# Patient Record
Sex: FEMALE | Race: WHITE | Employment: OTHER | URBAN - METROPOLITAN AREA
[De-identification: names, ages, dates, MRNs, and addresses within clinical notes are randomized per-mention and may not be internally consistent; named-entity substitution may affect disease eponyms.]

---

## 2024-04-03 ENCOUNTER — HOSPITAL ENCOUNTER (EMERGENCY)
Facility: HOSPITAL | Age: 84
Discharge: HOME | End: 2024-04-03
Payer: MEDICARE

## 2024-04-03 ENCOUNTER — APPOINTMENT (OUTPATIENT)
Facility: HOSPITAL | Age: 84
End: 2024-04-03
Payer: MEDICARE

## 2024-04-03 VITALS
HEIGHT: 70 IN | HEART RATE: 68 BPM | OXYGEN SATURATION: 99 % | DIASTOLIC BLOOD PRESSURE: 76 MMHG | BODY MASS INDEX: 16.75 KG/M2 | SYSTOLIC BLOOD PRESSURE: 159 MMHG | WEIGHT: 117 LBS | TEMPERATURE: 97.9 F | RESPIRATION RATE: 16 BRPM

## 2024-04-03 DIAGNOSIS — R07.9 CHEST PAIN, UNSPECIFIED TYPE: Primary | ICD-10-CM

## 2024-04-03 DIAGNOSIS — R03.0 ELEVATED BLOOD PRESSURE READING: ICD-10-CM

## 2024-04-03 DIAGNOSIS — R60.0 PERIPHERAL EDEMA: ICD-10-CM

## 2024-04-03 LAB
ALBUMIN SERPL-MCNC: 3.7 G/DL (ref 3.5–5)
ALBUMIN/GLOB SERPL: 1.3 (ref 1.1–2.2)
ALP SERPL-CCNC: 98 U/L (ref 45–117)
ALT SERPL-CCNC: 21 U/L (ref 12–78)
ANION GAP SERPL CALC-SCNC: 5 MMOL/L (ref 5–15)
AST SERPL-CCNC: 25 U/L (ref 15–37)
BASOPHILS # BLD: 0.1 K/UL (ref 0–0.1)
BASOPHILS NFR BLD: 1 % (ref 0–1)
BILIRUB SERPL-MCNC: 1.6 MG/DL (ref 0.2–1)
BUN SERPL-MCNC: 17 MG/DL (ref 6–20)
BUN/CREAT SERPL: 23 (ref 12–20)
CALCIUM SERPL-MCNC: 9.1 MG/DL (ref 8.5–10.1)
CHLORIDE SERPL-SCNC: 103 MMOL/L (ref 97–108)
CO2 SERPL-SCNC: 33 MMOL/L (ref 21–32)
CREAT SERPL-MCNC: 0.74 MG/DL (ref 0.55–1.02)
D DIMER PPP FEU-MCNC: 0.64 MG/L FEU (ref 0–0.65)
DIFFERENTIAL METHOD BLD: ABNORMAL
ECHO BSA: 1.62 M2
EKG ATRIAL RATE: 68 BPM
EKG DIAGNOSIS: NORMAL
EKG P AXIS: 73 DEGREES
EKG P-R INTERVAL: 210 MS
EKG Q-T INTERVAL: 412 MS
EKG QRS DURATION: 96 MS
EKG QTC CALCULATION (BAZETT): 438 MS
EKG R AXIS: -52 DEGREES
EKG T AXIS: 68 DEGREES
EKG VENTRICULAR RATE: 68 BPM
EOSINOPHIL # BLD: 0.1 K/UL (ref 0–0.4)
EOSINOPHIL NFR BLD: 2 % (ref 0–7)
ERYTHROCYTE [DISTWIDTH] IN BLOOD BY AUTOMATED COUNT: 13.4 % (ref 11.5–14.5)
GLOBULIN SER CALC-MCNC: 2.9 G/DL (ref 2–4)
GLUCOSE SERPL-MCNC: 92 MG/DL (ref 65–100)
HCT VFR BLD AUTO: 33.7 % (ref 35–47)
HGB BLD-MCNC: 11.6 G/DL (ref 11.5–16)
IMM GRANULOCYTES # BLD AUTO: 0 K/UL (ref 0–0.04)
IMM GRANULOCYTES NFR BLD AUTO: 0 % (ref 0–0.5)
LIPASE SERPL-CCNC: 47 U/L (ref 13–75)
LYMPHOCYTES # BLD: 1.1 K/UL (ref 0.8–3.5)
LYMPHOCYTES NFR BLD: 17 % (ref 12–49)
MCH RBC QN AUTO: 34.3 PG (ref 26–34)
MCHC RBC AUTO-ENTMCNC: 34.4 G/DL (ref 30–36.5)
MCV RBC AUTO: 99.7 FL (ref 80–99)
MONOCYTES # BLD: 0.6 K/UL (ref 0–1)
MONOCYTES NFR BLD: 9 % (ref 5–13)
NEUTS SEG # BLD: 4.5 K/UL (ref 1.8–8)
NEUTS SEG NFR BLD: 71 % (ref 32–75)
NRBC # BLD: 0 K/UL (ref 0–0.01)
NRBC BLD-RTO: 0 PER 100 WBC
NT PRO BNP: 354 PG/ML (ref 0–450)
PLATELET # BLD AUTO: 231 K/UL (ref 150–400)
PMV BLD AUTO: 10.6 FL (ref 8.9–12.9)
POTASSIUM SERPL-SCNC: 3.7 MMOL/L (ref 3.5–5.1)
PROT SERPL-MCNC: 6.6 G/DL (ref 6.4–8.2)
RBC # BLD AUTO: 3.38 M/UL (ref 3.8–5.2)
RBC MORPH BLD: ABNORMAL
SODIUM SERPL-SCNC: 141 MMOL/L (ref 136–145)
TROPONIN I SERPL HS-MCNC: 24 NG/L (ref 0–51)
TROPONIN I SERPL HS-MCNC: 27 NG/L (ref 0–51)
WBC # BLD AUTO: 6.4 K/UL (ref 3.6–11)

## 2024-04-03 PROCEDURE — 71045 X-RAY EXAM CHEST 1 VIEW: CPT

## 2024-04-03 PROCEDURE — 36415 COLL VENOUS BLD VENIPUNCTURE: CPT

## 2024-04-03 PROCEDURE — 80053 COMPREHEN METABOLIC PANEL: CPT

## 2024-04-03 PROCEDURE — 85379 FIBRIN DEGRADATION QUANT: CPT

## 2024-04-03 PROCEDURE — 83690 ASSAY OF LIPASE: CPT

## 2024-04-03 PROCEDURE — 93971 EXTREMITY STUDY: CPT

## 2024-04-03 PROCEDURE — 85025 COMPLETE CBC W/AUTO DIFF WBC: CPT

## 2024-04-03 PROCEDURE — 83880 ASSAY OF NATRIURETIC PEPTIDE: CPT

## 2024-04-03 PROCEDURE — 99285 EMERGENCY DEPT VISIT HI MDM: CPT

## 2024-04-03 PROCEDURE — 84484 ASSAY OF TROPONIN QUANT: CPT

## 2024-04-03 ASSESSMENT — HEART SCORE: ECG: NORMAL

## 2024-04-03 ASSESSMENT — PAIN - FUNCTIONAL ASSESSMENT: PAIN_FUNCTIONAL_ASSESSMENT: 0-10

## 2024-04-03 ASSESSMENT — PAIN DESCRIPTION - ORIENTATION: ORIENTATION: LEFT

## 2024-04-03 ASSESSMENT — PAIN SCALES - GENERAL: PAINLEVEL_OUTOF10: 6

## 2024-04-03 ASSESSMENT — PAIN DESCRIPTION - LOCATION: LOCATION: LEG

## 2024-04-03 ASSESSMENT — PAIN DESCRIPTION - DESCRIPTORS: DESCRIPTORS: ACHING

## 2024-04-03 NOTE — ED PROVIDER NOTES
[facility] EMERGENCY DEPT  EMERGENCY DEPARTMENT ENCOUNTER       Pt Name: Mirtha Espinal  MRN: [id number]  Birthdate 1940  Date of evaluation: 4/3/2024  Provider: JAMES Corbin   PCP: No primary care provider on file.  Note Started: 4:26 PM EDT 4/3/24     CHIEF COMPLAINT       Chief Complaint   Patient presents with    Chest Pain    Leg Swelling        HISTORY OF PRESENT ILLNESS: 1 or more elements      History From: Patient and Patient's Daughter    Mirtha Espinal is a 83 y.o. female with past medical history of Alzheimer's, who presents to the ED for evaluation of an episode of chest pain on Sunday, 3/31.  Patient accompanied by daughter who also contributes to history.  States her mother had an episode of chest pain on Sunday, states it was localized, gradually resolved and has not had any chest pain since, but the daughter would like her to be evaluated.  States they did just moved to the area several months ago and had not yet established primary care until their appointment in July.  Daughter states that she was on medications for hypertension and depression, but she took her off of these back in November.  States she has had some intermittent lower extremity swelling, seems after she has been up on her feet all day, and the swelling is gotten better today.  States she noticed it to her left leg more than her right.  States that yesterday she said her leg was itchy.  No swelling today.  Denies trauma or injuries.  Patient denies any chest pain at this time.  Denies prior cardiac history. Denies any shortness of breath, nausea, vomiting, diarrhea.  Denies recent illnesses.  States she is otherwise in her usual state of health.     Nursing Notes were all reviewed and agreed with or any disagreements were addressed in the HPI.     REVIEW OF SYSTEMS      Review of Systems   All other systems reviewed and are negative.       Positives and Pertinent negatives as per HPI.    PAST HISTORY     Past Medical

## 2024-04-03 NOTE — ED TRIAGE NOTES
Per daughter pt is very hard of hearing. Pt had chest pain on Sunday and then it resolved. Pt denies cp at this present time but reports left leg swelling and leg pain.

## 2024-04-03 NOTE — ED NOTES
Patient (s) daughter given copy of dc instructions and 0 script(s). Patient (s) daughter verbalized understanding of instructions and script (s). Patient given a current medication reconciliation form and verbalized understanding of their medications. Patient (s)daughter verbalized understanding of the importance of discussing medications with his or her physician or clinic they will be following up with. Patient alert and oriented and in no acute distress. Patient discharged home ambulatory with daughter.

## 2024-04-03 NOTE — ED NOTES
Pt presents to ED ambulatory complaining of having chest pain x 3 days ago and swelling on the left lower leg, but denies any chest pain currently. Pt is alert and oriented x 4, RR even and unlabored, skin is warm and dry. Assessment completed and pt updated on plan of care.  Call bell in reach.        Emergency Department Nursing Plan of Care       The Nursing Plan of Care is developed from the Nursing assessment and Emergency Department Attending provider initial evaluation.  The plan of care may be reviewed in the “ED Provider note”.    The Plan of Care was developed with the following considerations:   Patient / Family readiness to learn indicated by:verbalized understanding  Persons(s) to be included in education: patient and family  Barriers to Learning/Limitations:None    Signed

## 2024-04-03 NOTE — CARE COORDINATION
CM received a consult from ED to assist w/ PCP follow up, referrals for other support services. CM will meet w/ pt soon.     CM met w/ pt and her daughter (Evita Ordaz) at bedside. Pt gave verbal permission for CM to speak w/ daughter on her behalf. Pt has Alzheimer's and in need of PCP; currently has an appt for July. Moved here from [place] to be closer to daughter; needs to be connected w/ social support, possible personal care or home health.     Pt's daughter gave verbal permission for CM to make a referral for [facility], to connect pt w/ Care Transitions program. CM also offered to attempt finding an earlier PCP appt. Pt's daughter asked CM asked attending provider about Neuro follow up and local Alzheimer's resources.    CM scheduled pt for new pt appt w/ Dr. Carrillo at [facility] for 4/8/24 at 1:30pm. CM will send [facility] referral tomorrow morning. Also adding [employer] info,  Neuropsych provider on AVS for pt's family to reach out to for additional support.     Sendy Castellanos, SEAN, CM

## 2024-04-03 NOTE — DISCHARGE INSTRUCTIONS
Thank You!    It was a pleasure taking care of you in our Emergency Department today. We know that when you come to [facility], you are entrusting us with your health, comfort, and safety. Our clinicians honor that trust, and truly appreciate the opportunity to care for you and your loved ones.    If you receive a survey about your Emergency Department experience today, please fill it out.  We value your feedback. Thank you.      Zakiya Toscano PA-C    ___________________________________  I have included a copy of your lab results and/or radiologic studies from today's visit so you can have them easily available at your follow-up visit.   Recent Results (from the past 12 hour(s))   EKG 12 Lead    Collection Time: 04/03/24  2:06 PM   Result Value Ref Range    Ventricular Rate 68 BPM    Atrial Rate 68 BPM    P-R Interval 210 ms    QRS Duration 96 ms    Q-T Interval 412 ms    QTc Calculation (Bazett) 438 ms    P Axis 73 degrees    R Axis -52 degrees    T Axis 68 degrees    Diagnosis       Sinus rhythm with 1st degree AV block with frequent premature ventricular   complexes  Left anterior fascicular block  Moderate voltage criteria for LVH, may be normal variant ( R in aVL , [employer]   product )  Abnormal ECG  No previous ECGs available     CBC with Auto Differential    Collection Time: 04/03/24  2:19 PM   Result Value Ref Range    WBC 6.4 3.6 - 11.0 K/uL    RBC 3.38 (L) 3.80 - 5.20 M/uL    Hemoglobin 11.6 11.5 - 16.0 g/dL    Hematocrit 33.7 (L) 35.0 - 47.0 %    MCV 99.7 (H) 80.0 - 99.0 FL    MCH 34.3 (H) 26.0 - 34.0 PG    MCHC 34.4 30.0 - 36.5 g/dL    RDW 13.4 11.5 - 14.5 %    Platelets 231 150 - 400 K/uL    MPV 10.6 8.9 - 12.9 FL    Nucleated RBCs 0.0 0  WBC    nRBC 0.00 0.00 - 0.01 K/uL    Neutrophils % 71 32 - 75 %    Lymphocytes % 17 12 - 49 %    Monocytes % 9 5 - 13 %    Eosinophils % 2 0 - 7 %    Basophils % 1 0 - 1 %    Immature Granulocytes 0 0.0 - 0.5 %    Neutrophils

## 2024-04-09 ENCOUNTER — OFFICE VISIT (OUTPATIENT)
Age: 84
End: 2024-04-09
Payer: MEDICARE

## 2024-04-09 VITALS
OXYGEN SATURATION: 98 % | BODY MASS INDEX: 17.49 KG/M2 | HEART RATE: 63 BPM | HEIGHT: 70 IN | SYSTOLIC BLOOD PRESSURE: 130 MMHG | WEIGHT: 122.2 LBS | DIASTOLIC BLOOD PRESSURE: 64 MMHG

## 2024-04-09 DIAGNOSIS — I49.3 FREQUENT PVCS: ICD-10-CM

## 2024-04-09 DIAGNOSIS — R07.9 CHEST PAIN, UNSPECIFIED TYPE: Primary | ICD-10-CM

## 2024-04-09 DIAGNOSIS — I10 PRIMARY HYPERTENSION: ICD-10-CM

## 2024-04-09 PROCEDURE — 1123F ACP DISCUSS/DSCN MKR DOCD: CPT | Performed by: NURSE PRACTITIONER

## 2024-04-09 PROCEDURE — 99205 OFFICE O/P NEW HI 60 MIN: CPT | Performed by: NURSE PRACTITIONER

## 2024-04-09 PROCEDURE — 3075F SYST BP GE 130 - 139MM HG: CPT | Performed by: NURSE PRACTITIONER

## 2024-04-09 PROCEDURE — 3078F DIAST BP <80 MM HG: CPT | Performed by: NURSE PRACTITIONER

## 2024-04-09 RX ORDER — ASPIRIN 81 MG/1
81 TABLET, CHEWABLE ORAL DAILY
COMMUNITY

## 2024-04-09 RX ORDER — MONTELUKAST SODIUM 10 MG/1
TABLET ORAL
COMMUNITY
Start: 2024-04-08

## 2024-04-09 RX ORDER — ALENDRONATE SODIUM 70 MG/1
70 TABLET ORAL
COMMUNITY
Start: 2024-04-03

## 2024-04-09 RX ORDER — ACETAMINOPHEN 160 MG
2000 TABLET,DISINTEGRATING ORAL DAILY
COMMUNITY
End: 2024-04-09

## 2024-04-09 RX ORDER — FLUTICASONE PROPIONATE 50 MCG
SPRAY, SUSPENSION (ML) NASAL
COMMUNITY
Start: 2024-04-08 | End: 2024-04-09

## 2024-04-09 RX ORDER — ALBUTEROL SULFATE 90 UG/1
AEROSOL, METERED RESPIRATORY (INHALATION)
COMMUNITY
Start: 2024-04-08 | End: 2024-04-09 | Stop reason: ALTCHOICE

## 2024-04-09 RX ORDER — DIPHENHYDRAMINE HCL 25 MG
25 CAPSULE ORAL EVERY 6 HOURS PRN
COMMUNITY

## 2024-04-09 RX ORDER — FLUTICASONE FUROATE AND VILANTEROL TRIFENATATE 100; 25 UG/1; UG/1
POWDER RESPIRATORY (INHALATION)
COMMUNITY
Start: 2024-04-08 | End: 2024-04-09 | Stop reason: ALTCHOICE

## 2024-04-09 ASSESSMENT — ENCOUNTER SYMPTOMS
VOMITING: 0
ABDOMINAL DISTENTION: 0
NAUSEA: 1
ABDOMINAL PAIN: 0

## 2024-04-09 ASSESSMENT — PATIENT HEALTH QUESTIONNAIRE - PHQ9
1. LITTLE INTEREST OR PLEASURE IN DOING THINGS: NOT AT ALL
SUM OF ALL RESPONSES TO PHQ QUESTIONS 1-9: 0
SUM OF ALL RESPONSES TO PHQ QUESTIONS 1-9: 0
SUM OF ALL RESPONSES TO PHQ9 QUESTIONS 1 & 2: 0
2. FEELING DOWN, DEPRESSED OR HOPELESS: NOT AT ALL
SUM OF ALL RESPONSES TO PHQ QUESTIONS 1-9: 0
SUM OF ALL RESPONSES TO PHQ QUESTIONS 1-9: 0

## 2024-04-09 ASSESSMENT — VISUAL ACUITY: OU: 1

## 2024-04-09 NOTE — PROGRESS NOTES
Subjective:   Mirtha Espinal is a 83 y.o.  female with a past medical history including mild Alzheimer's dementia, asthma, and osteoporosis who presents today for post ED visit follow-up.    Patient recently developed a new sensation of chest pain and was subsequently evaluated the ED and had a normal cardiopulmonary workup.    Patient tells me that she was in her house doing chores when she started develop a sensation of \"pressure and heaviness\" in her chest.  She denies any pain, but felt quite uncomfortable.  She also felt a little bit dyspneic.  She started to feel quite anxious and texted her daughter for help but her daughter, who lives next-door, did not see the text.  She ended up walking to her daughter's house where she apparently had an elevated blood pressure and looked a little bit pale.  Her symptoms all resolved within about 15 minutes.  She also reports feeling a little bit of belching and dyspepsia at the time of her chest discomfort.  She was evaluated the ED that day, but 2 days later.    She recently moved to this area from [place].  She has a good relationship with her daughter (Evita) who is here today.    She did not take her prescribed bisphosphonates on the day of the symptoms and it does not sound like she was in a supine position or recently had any food.    She has a longstanding history of well-controlled asthma and does not believe the symptoms are consistent with her prior asthma attacks.    She denies any recurrent chest pain, palpitations, lightheadedness/dizziness, easy fatigability, dyspnea with activities.  She does experience intermittent chronic lower extremity swelling that improves with elevation.  Denies any cardiac history or any prior cardiac testing.      Primary Care Physician: No primary care provider on file.    Tobacco use: None  Alcohol use: None  Illicit drug use: None  Occupation: Retired  Pertinent Family history: No pertinent cardiac history  Physical

## 2024-04-09 NOTE — PATIENT INSTRUCTIONS
Definitely start taking 81 mg of aspirin daily until we can get the stress test to understand a little bit more about the heart.    We will also do a 5-day heart monitor since I heard quite a bit of skipped beats.  I will provide some printed information about the skipped beats, called PVCs.

## 2024-04-18 ENCOUNTER — HOSPITAL ENCOUNTER (OUTPATIENT)
Facility: HOSPITAL | Age: 84
Discharge: HOME | End: 2024-04-20
Payer: MEDICARE

## 2024-04-18 ENCOUNTER — HOSPITAL ENCOUNTER (OUTPATIENT)
Facility: HOSPITAL | Age: 84
End: 2024-04-18
Payer: MEDICARE

## 2024-04-18 VITALS
HEIGHT: 70 IN | RESPIRATION RATE: 18 BRPM | BODY MASS INDEX: 17.47 KG/M2 | SYSTOLIC BLOOD PRESSURE: 140 MMHG | WEIGHT: 122 LBS | DIASTOLIC BLOOD PRESSURE: 80 MMHG | HEART RATE: 76 BPM

## 2024-04-18 DIAGNOSIS — R07.9 CHEST PAIN, UNSPECIFIED TYPE: ICD-10-CM

## 2024-04-18 DIAGNOSIS — I49.3 FREQUENT PVCS: ICD-10-CM

## 2024-04-18 DIAGNOSIS — I10 PRIMARY HYPERTENSION: ICD-10-CM

## 2024-04-18 LAB
ECHO BSA: 1.65 M2
ECHO BSA: 1.65 M2
EKG DIAGNOSIS: NORMAL
STRESS BASELINE DIAS BP: 82 MMHG
STRESS BASELINE HR: 71 BPM
STRESS BASELINE SYS BP: 140 MMHG
STRESS ESTIMATED WORKLOAD: 1 METS
STRESS EXERCISE DUR MIN: 3 MIN
STRESS EXERCISE DUR SEC: 45 SEC
STRESS O2 SAT REST: 99 %
STRESS PEAK DIAS BP: 73 MMHG
STRESS PEAK SYS BP: 159 MMHG
STRESS PERCENT HR ACHIEVED: 70 %
STRESS POST PEAK HR: 97 BPM
STRESS RATE PRESSURE PRODUCT: NORMAL BPM*MMHG
STRESS TARGET HR: 138 BPM

## 2024-04-18 PROCEDURE — 3430000000 HC RX DIAGNOSTIC RADIOPHARMACEUTICAL: Performed by: NURSE PRACTITIONER

## 2024-04-18 PROCEDURE — A9500 TC99M SESTAMIBI: HCPCS | Performed by: NURSE PRACTITIONER

## 2024-04-18 PROCEDURE — 93017 CV STRESS TEST TRACING ONLY: CPT

## 2024-04-18 PROCEDURE — 6360000002 HC RX W HCPCS: Performed by: NURSE PRACTITIONER

## 2024-04-18 PROCEDURE — 93242 EXT ECG>48HR<7D RECORDING: CPT

## 2024-04-18 PROCEDURE — 93018 CV STRESS TEST I&R ONLY: CPT | Performed by: SPECIALIST

## 2024-04-18 PROCEDURE — 93016 CV STRESS TEST SUPVJ ONLY: CPT | Performed by: SPECIALIST

## 2024-04-18 RX ORDER — TETRAKIS(2-METHOXYISOBUTYLISOCYANIDE)COPPER(I) TETRAFLUOROBORATE 1 MG/ML
30 INJECTION, POWDER, LYOPHILIZED, FOR SOLUTION INTRAVENOUS ONCE
Status: COMPLETED | OUTPATIENT
Start: 2024-04-18 | End: 2024-04-18

## 2024-04-18 RX ORDER — TETRAKIS(2-METHOXYISOBUTYLISOCYANIDE)COPPER(I) TETRAFLUOROBORATE 1 MG/ML
10 INJECTION, POWDER, LYOPHILIZED, FOR SOLUTION INTRAVENOUS ONCE
Status: COMPLETED | OUTPATIENT
Start: 2024-04-18 | End: 2024-04-18

## 2024-04-18 RX ORDER — REGADENOSON 0.08 MG/ML
0.4 INJECTION, SOLUTION INTRAVENOUS
Status: COMPLETED | OUTPATIENT
Start: 2024-04-18 | End: 2024-04-18

## 2024-04-18 RX ADMIN — TECHNETIUM TC-99M SESTAMIBI 30 MILLICURIE: 1 INJECTION INTRAVENOUS at 10:30

## 2024-04-18 RX ADMIN — REGADENOSON 0.4 MG: 0.08 INJECTION, SOLUTION INTRAVENOUS at 10:32

## 2024-04-18 RX ADMIN — TECHNETIUM TC-99M SESTAMIBI 10 MILLICURIE: 1 INJECTION INTRAVENOUS at 08:15

## 2024-04-19 NOTE — PROGRESS NOTES
Discussed normal stress test with patient's daughter.  They were advised that they can stop taking aspirin at this time.  Will plan on follow-up next month to discuss results of event monitor and treatment of frequent PVCs.

## 2024-05-03 LAB — ECHO BSA: 1.65 M2

## 2024-05-06 LAB — ECHO BSA: 1.65 M2

## 2024-05-07 ENCOUNTER — OFFICE VISIT (OUTPATIENT)
Age: 84
End: 2024-05-07

## 2024-05-07 VITALS
SYSTOLIC BLOOD PRESSURE: 112 MMHG | BODY MASS INDEX: 17.47 KG/M2 | DIASTOLIC BLOOD PRESSURE: 68 MMHG | HEART RATE: 52 BPM | OXYGEN SATURATION: 99 % | HEIGHT: 70 IN | WEIGHT: 122 LBS

## 2024-05-07 DIAGNOSIS — R07.9 CHEST PAIN, UNSPECIFIED TYPE: Primary | ICD-10-CM

## 2024-05-07 DIAGNOSIS — I47.19 PAT (PAROXYSMAL ATRIAL TACHYCARDIA) (HCC): ICD-10-CM

## 2024-05-07 DIAGNOSIS — I49.3 FREQUENT PVCS: ICD-10-CM

## 2024-05-07 DIAGNOSIS — I10 PRIMARY HYPERTENSION: ICD-10-CM

## 2024-05-07 LAB
EKG ATRIAL RATE: 68 BPM
EKG DIAGNOSIS: NORMAL
EKG P AXIS: 73 DEGREES
EKG P-R INTERVAL: 210 MS
EKG Q-T INTERVAL: 412 MS
EKG QRS DURATION: 96 MS
EKG QTC CALCULATION (BAZETT): 438 MS
EKG R AXIS: -52 DEGREES
EKG T AXIS: 68 DEGREES
EKG VENTRICULAR RATE: 68 BPM

## 2024-05-07 ASSESSMENT — ENCOUNTER SYMPTOMS
NAUSEA: 1
VOMITING: 0
ABDOMINAL DISTENTION: 0
ABDOMINAL PAIN: 0

## 2024-05-07 ASSESSMENT — VISUAL ACUITY: OU: 1

## 2024-05-07 ASSESSMENT — PATIENT HEALTH QUESTIONNAIRE - PHQ9
SUM OF ALL RESPONSES TO PHQ QUESTIONS 1-9: 0
1. LITTLE INTEREST OR PLEASURE IN DOING THINGS: NOT AT ALL
SUM OF ALL RESPONSES TO PHQ QUESTIONS 1-9: 0
2. FEELING DOWN, DEPRESSED OR HOPELESS: NOT AT ALL
SUM OF ALL RESPONSES TO PHQ QUESTIONS 1-9: 0
SUM OF ALL RESPONSES TO PHQ QUESTIONS 1-9: 0
SUM OF ALL RESPONSES TO PHQ9 QUESTIONS 1 & 2: 0

## 2024-05-07 NOTE — PATIENT INSTRUCTIONS
Try calling her insurance to see what the out-of-pocket cost would be for the medication called ranolazine (Ranexa) 500 mg twice daily.

## 2024-05-07 NOTE — PROGRESS NOTES
Subjective:   Mirtha Espinal is a 83 y.o.  female with a past medical history including mild Alzheimer's dementia, asthma, and osteoporosis who presents today for 1 month follow up.    Patient has a blood pressure 112/68 (not on any antihypertensives) and a resting pulse in the 50s which I presume is due to some pseudobradycardia from active PVCs.    She is doing well and has no cardiac complaints.  She has not had any recurrent episodes of chest pain and is very relieved to hear that her nuclear stress test came back looking normal.    She is asymptomatic of her ectopy and denies any limitations in activity due to abnormal fatigue, lightheadedness/dizziness, presyncope.        Previous visit on 4.9.24:  Patient recently developed a new sensation of chest pain and was subsequently evaluated the ED and had a normal cardiopulmonary workup.    Patient tells me that she was in her house doing chores when she started develop a sensation of \"pressure and heaviness\" in her chest.  She denies any pain, but felt quite uncomfortable.  She also felt a little bit dyspneic.  She started to feel quite anxious and texted her daughter for help but her daughter, who lives next-door, did not see the text.  She ended up walking to her daughter's house where she apparently had an elevated blood pressure and looked a little bit pale.  Her symptoms all resolved within about 15 minutes.  She also reports feeling a little bit of belching and dyspepsia at the time of her chest discomfort.  She was not evaluated the ED that day, but 2 days later.    She recently moved to this area from [place].  She has a good relationship with her daughter (Evita) who is here today.    She did not take her prescribed bisphosphonates on the day of the symptoms and it does not sound like she was in a supine position or recently had any food.    She has a longstanding history of well-controlled asthma and does not believe the symptoms are

## 2024-05-22 ENCOUNTER — TELEPHONE (OUTPATIENT)
Age: 84
End: 2024-05-22

## 2024-05-22 NOTE — TELEPHONE ENCOUNTER
Incoming fax received from [facility] for medical records for this patient,requested by Georgia Carrillo NP.  Sent by internal fax through [employer] system.    Received by Ila at [facility] per phone verification.

## 2024-10-14 ENCOUNTER — HOSPITAL ENCOUNTER (OUTPATIENT)
Facility: HOSPITAL | Age: 84
Discharge: HOME | End: 2024-10-16
Payer: MEDICARE

## 2024-10-14 DIAGNOSIS — I49.3 FREQUENT PVCS: ICD-10-CM

## 2024-10-14 PROCEDURE — 93242 EXT ECG>48HR<7D RECORDING: CPT

## 2024-10-14 NOTE — NURSING NOTE
5 day Extended monitor placed on patient. Patient educated on device and its use. Also reviewed materials provided in kit. [employer] supplies available within kit. Patient to return the device to [employer] via [employer] after order duration. Contact information provided to the department for any questions. No questions at this time.      Monitor #  [id number]   [facility]/EKG/Imaging Studies/Medications

## 2024-11-04 ENCOUNTER — CLINICAL DOCUMENTATION (OUTPATIENT)
Age: 84
End: 2024-11-04

## 2024-11-04 NOTE — PROGRESS NOTES
Patient was unable to make her recent appointment due to lack of transportation.  Her event monitor showed a few very brief episodes of narrow complex tachycardia and overall reduced PVC burden not on any.  I spoke with her daughter, Evita, about the results and she stated that her mom has been feeling in her normal state of health without any cardiopulmonary concerns whatsoever.  Based on the testing we have obtained so far and lieu of her resolved symptoms I do not think she needs to see cardiology regularly for any reason.  They were advised to reach out as needed in the future for any concerns.

## 2025-01-17 ENCOUNTER — TRANSCRIBE ORDERS (OUTPATIENT)
Facility: HOSPITAL | Age: 85
End: 2025-01-17

## 2025-01-17 ENCOUNTER — HOSPITAL ENCOUNTER (OUTPATIENT)
Facility: HOSPITAL | Age: 85
Discharge: HOME | End: 2025-01-20
Payer: MEDICARE

## 2025-01-17 DIAGNOSIS — R05.9 COUGH, UNSPECIFIED TYPE: ICD-10-CM

## 2025-01-17 DIAGNOSIS — R05.9 COUGH, UNSPECIFIED TYPE: Primary | ICD-10-CM

## 2025-01-17 PROCEDURE — 71046 X-RAY EXAM CHEST 2 VIEWS: CPT
